# Patient Record
Sex: MALE | Race: WHITE | NOT HISPANIC OR LATINO | Employment: OTHER | URBAN - METROPOLITAN AREA
[De-identification: names, ages, dates, MRNs, and addresses within clinical notes are randomized per-mention and may not be internally consistent; named-entity substitution may affect disease eponyms.]

---

## 2024-09-12 ENCOUNTER — OFFICE VISIT (OUTPATIENT)
Dept: INTERNAL MEDICINE | Facility: CLINIC | Age: 64
End: 2024-09-12
Payer: COMMERCIAL

## 2024-09-12 ENCOUNTER — LAB (OUTPATIENT)
Dept: LAB | Facility: CLINIC | Age: 64
End: 2024-09-12
Payer: COMMERCIAL

## 2024-09-12 VITALS
SYSTOLIC BLOOD PRESSURE: 129 MMHG | OXYGEN SATURATION: 97 % | DIASTOLIC BLOOD PRESSURE: 78 MMHG | WEIGHT: 185.3 LBS | HEART RATE: 86 BPM

## 2024-09-12 DIAGNOSIS — Z76.89 ENCOUNTER TO ESTABLISH CARE: ICD-10-CM

## 2024-09-12 DIAGNOSIS — Z11.4 SCREENING FOR HIV (HUMAN IMMUNODEFICIENCY VIRUS): ICD-10-CM

## 2024-09-12 DIAGNOSIS — E78.2 MIXED HYPERLIPIDEMIA: ICD-10-CM

## 2024-09-12 DIAGNOSIS — Z11.59 NEED FOR HEPATITIS C SCREENING TEST: ICD-10-CM

## 2024-09-12 DIAGNOSIS — Z12.5 SCREENING FOR PROSTATE CANCER: ICD-10-CM

## 2024-09-12 DIAGNOSIS — Z11.4 SCREENING FOR HIV (HUMAN IMMUNODEFICIENCY VIRUS): Primary | ICD-10-CM

## 2024-09-12 DIAGNOSIS — R73.03 PRE-DIABETES: ICD-10-CM

## 2024-09-12 LAB
ANION GAP SERPL CALCULATED.3IONS-SCNC: 10 MMOL/L (ref 7–15)
BUN SERPL-MCNC: 13.5 MG/DL (ref 8–23)
CALCIUM SERPL-MCNC: 9.1 MG/DL (ref 8.8–10.4)
CHLORIDE SERPL-SCNC: 103 MMOL/L (ref 98–107)
CHOLEST SERPL-MCNC: 151 MG/DL
CREAT SERPL-MCNC: 1 MG/DL (ref 0.67–1.17)
EGFRCR SERPLBLD CKD-EPI 2021: 85 ML/MIN/1.73M2
FASTING STATUS PATIENT QL REPORTED: NO
FASTING STATUS PATIENT QL REPORTED: NO
GLUCOSE SERPL-MCNC: 115 MG/DL (ref 70–99)
HBA1C MFR BLD: 6.1 %
HCO3 SERPL-SCNC: 26 MMOL/L (ref 22–29)
HCV AB SERPL QL IA: NONREACTIVE
HDLC SERPL-MCNC: 52 MG/DL
HIV 1+2 AB+HIV1 P24 AG SERPL QL IA: NONREACTIVE
LDLC SERPL CALC-MCNC: 80 MG/DL
NONHDLC SERPL-MCNC: 99 MG/DL
POTASSIUM SERPL-SCNC: 4.3 MMOL/L (ref 3.4–5.3)
PSA SERPL DL<=0.01 NG/ML-MCNC: 0.84 NG/ML (ref 0–4.5)
SODIUM SERPL-SCNC: 139 MMOL/L (ref 135–145)
TRIGL SERPL-MCNC: 93 MG/DL

## 2024-09-12 PROCEDURE — 99000 SPECIMEN HANDLING OFFICE-LAB: CPT | Performed by: PATHOLOGY

## 2024-09-12 PROCEDURE — G0103 PSA SCREENING: HCPCS | Performed by: PATHOLOGY

## 2024-09-12 PROCEDURE — 80061 LIPID PANEL: CPT | Performed by: PATHOLOGY

## 2024-09-12 PROCEDURE — 86803 HEPATITIS C AB TEST: CPT

## 2024-09-12 PROCEDURE — 99203 OFFICE O/P NEW LOW 30 MIN: CPT | Mod: GE

## 2024-09-12 PROCEDURE — 87389 HIV-1 AG W/HIV-1&-2 AB AG IA: CPT

## 2024-09-12 PROCEDURE — 83036 HEMOGLOBIN GLYCOSYLATED A1C: CPT

## 2024-09-12 PROCEDURE — 36415 COLL VENOUS BLD VENIPUNCTURE: CPT | Performed by: PATHOLOGY

## 2024-09-12 PROCEDURE — 80048 BASIC METABOLIC PNL TOTAL CA: CPT | Performed by: PATHOLOGY

## 2024-09-12 RX ORDER — ATORVASTATIN CALCIUM 20 MG/1
20 TABLET, FILM COATED ORAL DAILY
COMMUNITY
Start: 2024-08-05

## 2024-09-12 RX ORDER — AMLODIPINE AND BENAZEPRIL HYDROCHLORIDE 5; 20 MG/1; MG/1
1 CAPSULE ORAL DAILY
COMMUNITY
Start: 2024-04-16

## 2024-09-12 RX ORDER — TADALAFIL 20 MG/1
20 TABLET ORAL DAILY PRN
COMMUNITY
Start: 2024-01-19

## 2024-09-12 NOTE — PROGRESS NOTES
Assessment & Plan     Preventative health screening  - BMP, A1c, PSA, Lipid panel  - HIV, Hep C screening    HTN  Well controlled on Amlodipine    Hyperlipidemia  PTA Lipitor 20 mg daily    Will await medical records (consent for e care obtained at this visit)    Return in about 1 year (around 9/12/2025) for Routine preventive, with any available provider.    Kenneth Morales is a 63 year old, presenting for the following health issues:  Establish Care        9/12/2024    12:53 PM   Additional Questions   Roomed by [facility] EMT     History of Present Illness       Reason for visit:  First visit checkup   He is taking medications regularly.  Pt here to establish care due to insurance change, prior followed at [facility] [place]. Pt has a hx of HTN and Hyperlipidemia.     FH: dad had multiple strokes, mother has kidney cancer. Dad had prostate cancer. Has 2 sons healthy. No immediate family hx of colon cancer. Report had a colonoscopy in 2019 which was normal.     SH: does not smoke cigarettes, drinks 1-2 beers or wine per day. Takes occasional THC gummies twice weakly. Drinks coffee daily. Pt does yoga stretching daily and walking his dog, used to run marathons in the past, trying to get back into running. Usually eats home cooked meals. Pt feels safe at home, denies any suicidal ideation.         Objective    /78 (BP Location: Right arm, Patient Position: Sitting, Cuff Size: Adult Regular)   Pulse 86   Wt 84.1 kg (185 lb 4.8 oz)   SpO2 97%   There is no height or weight on file to calculate BMI.  Physical Exam   Gen: NAD, alert, cooperative, non-toxic  HEENT: EOMI, no conjunctival icterus, tracking appropriately  Resp: CTAB, no crackles or wheezes, no increased WOB  Cardiac: RRR, no S3/S4, no M/R/G appreciated  GI: soft, non-tender, non-distended  Ext: WWP, no edema, no erythema  Neuro: AOx3, sensation intact b/l, no focal deficits  Psych: appropriate mood & affect, no suicidal or homicidal ideation    Patient's plan  discussed with Dr. Christiansen    Signed Electronically by:   Mihaela Max DO, PGY3  Internal Medicine

## 2024-10-06 ENCOUNTER — HEALTH MAINTENANCE LETTER (OUTPATIENT)
Age: 64
End: 2024-10-06